# Patient Record
Sex: FEMALE | Race: BLACK OR AFRICAN AMERICAN | Employment: UNEMPLOYED | URBAN - METROPOLITAN AREA
[De-identification: names, ages, dates, MRNs, and addresses within clinical notes are randomized per-mention and may not be internally consistent; named-entity substitution may affect disease eponyms.]

---

## 2024-04-05 ENCOUNTER — HOSPITAL ENCOUNTER (OUTPATIENT)
Age: 24
Discharge: HOME | End: 2024-04-05
Payer: MEDICAID

## 2024-04-05 VITALS
OXYGEN SATURATION: 99 % | TEMPERATURE: 97 F | RESPIRATION RATE: 16 BRPM | HEART RATE: 92 BPM | SYSTOLIC BLOOD PRESSURE: 134 MMHG | DIASTOLIC BLOOD PRESSURE: 81 MMHG

## 2024-04-05 DIAGNOSIS — R59.1 LYMPHADENOPATHY: Primary | ICD-10-CM

## 2024-04-05 PROCEDURE — 99203 OFFICE O/P NEW LOW 30 MIN: CPT | Performed by: NURSE PRACTITIONER

## 2024-04-05 RX ORDER — NORGESTIMATE AND ETHINYL ESTRADIOL 7DAYSX3 28
1 KIT ORAL DAILY
COMMUNITY
Start: 2024-03-01

## 2024-04-05 NOTE — ED PROVIDER NOTES
Patient Seen in: [facility]      History     Chief Complaint   Patient presents with    Neck Pain     Stated Complaint: neck problem    Subjective:   HPI    23-year-old female presents with swelling to the right side of her neck.  She states it is tender when she touches it.  Cold symptoms last week which resolved.  She states she did have a bump underneath her chin as well which resolved.  No fever.  No fatigue.  No other complaints.  She has no painful swallowing.    Objective:   History reviewed. No pertinent past medical history.           History reviewed. No pertinent surgical history.             Social History     Socioeconomic History    Marital status: Single   Tobacco Use    Smoking status: Never     Passive exposure: Never    Smokeless tobacco: Never   Vaping Use    Vaping Use: Never used              Review of Systems    Positive for stated complaint: neck problem  Other systems are as noted in HPI.  Constitutional and vital signs reviewed.      All other systems reviewed and negative except as noted above.    Physical Exam     ED Triage Vitals [04/05/24 1337]   /81   Pulse 92   Resp 16   Temp 97.3 °F (36.3 °C)   Temp src Temporal   SpO2 99 %   O2 Device None (Room air)       Current:/81   Pulse 92   Temp 97.3 °F (36.3 °C) (Temporal)   Resp 16   LMP 03/31/2024 (Approximate)   SpO2 99%         Physical Exam  Vitals and nursing note reviewed.   Constitutional:       Appearance: Normal appearance.   HENT:      Left Ear: Tympanic membrane normal.      Nose: No congestion or rhinorrhea.      Mouth/Throat:      Pharynx: No oropharyngeal exudate or posterior oropharyngeal erythema.   Cardiovascular:      Rate and Rhythm: Normal rate.      Pulses: Normal pulses.   Pulmonary:      Effort: Pulmonary effort is normal.   Musculoskeletal:      Cervical back: Neck supple. No tenderness.   Lymphadenopathy:      Cervical: Cervical adenopathy (Right anterior lateral lymphadenopathy)  present.   Skin:     General: Skin is warm and dry.      Findings: No rash.   Neurological:      Mental Status: She is alert.               ED Course   Labs Reviewed - No data to display                   MDM      23-year-old female with the above complaints  Lymphadenopathy viral versus bacterial illness considered mass considered thyroid  Swollen lymph node of the right cervical chain advised heat, ibuprofen, monitoring if symptoms worsen she should follow-up with her doctor or the ENT specialist                                   Medical Decision Making  Problems Addressed:  Lymphadenopathy: acute illness or injury    Risk  OTC drugs.        Disposition and Plan     Clinical Impression:  1. Lymphadenopathy         Disposition:  Discharge  4/5/2024  2:08 pm    Follow-up:  Vishal Gamino MD  [address] [place] [id number]  [phone]    Schedule an appointment as soon as possible for a visit   If symptoms worsen          Medications Prescribed:  Current Discharge Medication List